# Patient Record
Sex: FEMALE | Race: WHITE | Employment: OTHER | URBAN - METROPOLITAN AREA
[De-identification: names, ages, dates, MRNs, and addresses within clinical notes are randomized per-mention and may not be internally consistent; named-entity substitution may affect disease eponyms.]

---

## 2018-05-27 ENCOUNTER — HOSPITAL ENCOUNTER (EMERGENCY)
Age: 83
Discharge: HOME | End: 2018-05-27
Attending: EMERGENCY MEDICINE
Payer: MEDICARE

## 2018-05-27 ENCOUNTER — APPOINTMENT (OUTPATIENT)
Dept: CT IMAGING | Age: 83
End: 2018-05-27
Attending: EMERGENCY MEDICINE
Payer: MEDICARE

## 2018-05-27 VITALS
RESPIRATION RATE: 18 BRPM | WEIGHT: 137 LBS | SYSTOLIC BLOOD PRESSURE: 135 MMHG | BODY MASS INDEX: 22.82 KG/M2 | OXYGEN SATURATION: 96 % | TEMPERATURE: 97.9 F | HEART RATE: 85 BPM | DIASTOLIC BLOOD PRESSURE: 74 MMHG | HEIGHT: 65 IN

## 2018-05-27 DIAGNOSIS — R42 VERTIGO: Primary | ICD-10-CM

## 2018-05-27 LAB
APPEARANCE UR: ABNORMAL
BACTERIA URNS QL MICRO: NEGATIVE /HPF
BILIRUB UR QL: NEGATIVE
COLOR UR: ABNORMAL
EPITH CASTS URNS QL MICRO: ABNORMAL /LPF
GLUCOSE UR STRIP.AUTO-MCNC: NEGATIVE MG/DL
HGB UR QL STRIP: NEGATIVE
KETONES UR QL STRIP.AUTO: >80 MG/DL
LEUKOCYTE ESTERASE UR QL STRIP.AUTO: ABNORMAL
NITRITE UR QL STRIP.AUTO: NEGATIVE
OTHER,OTHU: ABNORMAL
PH UR STRIP: 6 [PH] (ref 5–8)
PROT UR STRIP-MCNC: NEGATIVE MG/DL
RBC #/AREA URNS HPF: ABNORMAL /HPF (ref 0–5)
SP GR UR REFRACTOMETRY: 1.02 (ref 1–1.03)
UR CULT HOLD, URHOLD: NORMAL
UROBILINOGEN UR QL STRIP.AUTO: 0.2 EU/DL (ref 0.2–1)
WBC URNS QL MICRO: ABNORMAL /HPF (ref 0–4)

## 2018-05-27 PROCEDURE — 99283 EMERGENCY DEPT VISIT LOW MDM: CPT

## 2018-05-27 PROCEDURE — 81001 URINALYSIS AUTO W/SCOPE: CPT | Performed by: EMERGENCY MEDICINE

## 2018-05-27 PROCEDURE — 70450 CT HEAD/BRAIN W/O DYE: CPT

## 2018-05-27 PROCEDURE — 93005 ELECTROCARDIOGRAM TRACING: CPT

## 2018-05-27 RX ORDER — MECLIZINE HYDROCHLORIDE 25 MG/1
25 TABLET ORAL
Qty: 20 TAB | Refills: 0 | Status: SHIPPED | OUTPATIENT
Start: 2018-05-27

## 2018-05-27 RX ORDER — MECLIZINE HCL 12.5 MG 12.5 MG/1
25 TABLET ORAL
Status: DISCONTINUED | OUTPATIENT
Start: 2018-05-27 | End: 2018-05-27 | Stop reason: HOSPADM

## 2018-05-27 NOTE — ED PROVIDER NOTES
HPI Comments: 80 y.o. female with past medical history significant for macular degeneration who presents ambulatory from home, referred by [facility] with chief complaint of dizziness. Patient notes onset of dizziness yesterday morning described as \"room spinning. \" Patient notes drinking water with some relief. Patient reports that dizziness is worse with getting up and ambulating. Patient denies history of similar episode of dizziness. Patient notes \"slight\" headache last night and reports taking Aleve with relief. Patient also complains of nausea this morning and appetite loss. Of note, patient's CBC and BMP from [facility] were normal. Pertinent negatives include fever, cough, chest pain, shortness of breath, numbness, tingling, and weakness. There are no other acute medical concerns at this time. Social hx: Denies tobacco use; denies alcohol use; denies illicit drug use  PCP: Hawa Barraza MD    Note written by Jojo Schwartz, as dictated by Anupama Callaway MD 3:15 PM      The history is provided by the patient and a relative. No  was used. Past Medical History:   Diagnosis Date    Macular degeneration        Past Surgical History:   Procedure Laterality Date    HX HIP REPLACEMENT      left         History reviewed. No pertinent family history. Social History     Social History    Marital status:      Spouse name: N/A    Number of children: N/A    Years of education: N/A     Occupational History    Not on file. Social History Main Topics    Smoking status: Never Smoker    Smokeless tobacco: Never Used    Alcohol use No    Drug use: No    Sexual activity: Not on file     Other Topics Concern    Not on file     Social History Narrative         ALLERGIES: Review of patient's allergies indicates no known allergies. Review of Systems   Constitutional: Positive for appetite change (decreased). Negative for fever.    Respiratory: Negative for cough and shortness of breath. Cardiovascular: Negative for chest pain. Gastrointestinal: Positive for nausea. Neurological: Positive for dizziness and headaches (resolved). Negative for weakness and numbness. Vitals:    05/27/18 1343   BP: 139/76   Pulse: 88   Resp: 20   Temp: 97.9 °F (36.6 °C)   SpO2: 95%   Weight: 62.1 kg (137 lb)   Height: 5' 5\" (1.651 m)            Physical Exam   Constitutional: She is oriented to person, place, and time. She appears well-developed. No distress. Elderly; frail   HENT:   Head: Normocephalic and atraumatic. Eyes: Conjunctivae are normal. No scleral icterus. Right eye exhibits nystagmus (horizontal). Left eye exhibits nystagmus (horizontal). Pupils round, equal, and reactive   Neck: Neck supple. No tracheal deviation present. Cardiovascular: Normal rate, regular rhythm, normal heart sounds and intact distal pulses. Exam reveals no gallop and no friction rub. No murmur heard. Pulmonary/Chest: Effort normal and breath sounds normal. She has no wheezes. She has no rales. Abdominal: Soft. She exhibits no distension. There is no tenderness. There is no rebound and no guarding. Musculoskeletal: She exhibits no edema. Neurological: She is alert and oriented to person, place, and time. Skin: Skin is warm and dry. No rash noted. Psychiatric: She has a normal mood and affect. Nursing note and vitals reviewed. Note written by Jojo Richey, as dictated by No att. providers found 3:15 PM      [facility]      ED Course       Procedures    Progress Note:  Results, treatment, and follow up plan have been discussed with patient/son. Questions were answered. Romaine Leon MD     A/P: dizziness c/w BPPV; also c/o HA - reassuring appearance and exam; VSS; CBC (WBC 15 noted and of unclear significance), CMP, trop, EKG, head CT ok; UA with ketones; home with meclizine and PCP f/u.   Romaine Leon MD

## 2018-05-27 NOTE — ED TRIAGE NOTES
Triage:  Pt to ED by referral of [facility] due to concerns over intermittent dizziness that has been present for the past day. Pt states the dizziness symptoms occur at rest and with movement. Pt describes the symptoms has the surroundings or the room is spinning. Pt denies any recent falls, HA or other complaints. Pt into triage with wheel chair.

## 2018-05-27 NOTE — DISCHARGE INSTRUCTIONS
Vertigo: Care Instructions  Your Care Instructions    Vertigo is the feeling that you or your surroundings are moving when there is no actual movement. It is often described as a feeling of spinning, whirling, falling, or tilting. Vertigo may make you vomit or feel nauseated. You may have trouble standing or walking and may lose your balance. Vertigo is often related to an inner ear problem, but it can have other more serious causes. If vertigo continues, you may need more tests to find its cause. Follow-up care is a key part of your treatment and safety. Be sure to make and go to all appointments, and call your doctor if you are having problems. It's also a good idea to know your test results and keep a list of the medicines you take. How can you care for yourself at home? · Do not lie flat on your back. Prop yourself up slightly. This may reduce the spinning feeling. Keep your eyes open. · Move slowly so that you do not fall. · If your doctor recommends medicine, take it exactly as directed. · Do not drive while you are having vertigo. Certain exercises, called Harper-Daroff exercises, can help decrease vertigo. To do Harper-Daroff exercises:  · Sit on the edge of a bed or sofa and quickly lie down on the side that causes the worst vertigo. Lie on your side with your ear down. · Stay in this position for at least 30 seconds or until the vertigo goes away. · Sit up. If this causes vertigo, wait for it to stop. · Repeat the procedure on the other side. · Repeat this 10 times. Do these exercises 2 times a day until the vertigo is gone. When should you call for help? Call 911 anytime you think you may need emergency care. For example, call if:  ? · You passed out (lost consciousness). ? · You have symptoms of a stroke. These may include:  ¨ Sudden numbness, tingling, weakness, or loss of movement in your face, arm, or leg, especially on only one side of your body.   ¨ Sudden vision changes. ¨ Sudden trouble speaking. ¨ Sudden confusion or trouble understanding simple statements. ¨ Sudden problems with walking or balance. ¨ A sudden, severe headache that is different from past headaches. ?Call your doctor now or seek immediate medical care if:  ? · Vertigo occurs with a fever, a headache, or ringing in your ears. ? · You have new or increased nausea and vomiting. ? Watch closely for changes in your health, and be sure to contact your doctor if:  ? · Vertigo gets worse or happens more often. ? · Vertigo has not gotten better after 2 weeks. Where can you learn more? Go to http://mohan-justine.info/. Enter Y552 in the search box to learn more about \"Vertigo: Care Instructions. \"  Current as of: May 12, 2017  Content Version: 11.4  © 9139-1106 [employer]. Care instructions adapted under license by [employer] (which disclaims liability or warranty for this information). If you have questions about a medical condition or this instruction, always ask your healthcare professional. [place] [address] any warranty or liability for your use of this information.

## 2018-05-29 LAB
ATRIAL RATE: 81 BPM
CALCULATED P AXIS, ECG09: 37 DEGREES
CALCULATED R AXIS, ECG10: 11 DEGREES
CALCULATED T AXIS, ECG11: 33 DEGREES
DIAGNOSIS, 93000: NORMAL
P-R INTERVAL, ECG05: 118 MS
Q-T INTERVAL, ECG07: 410 MS
QRS DURATION, ECG06: 84 MS
QTC CALCULATION (BEZET), ECG08: 476 MS
VENTRICULAR RATE, ECG03: 81 BPM

## 2022-10-09 ENCOUNTER — HOSPITAL ENCOUNTER (EMERGENCY)
Age: 87
Discharge: NURSING, REHAB OR LONG-TERM CARE | End: 2022-10-09
Attending: STUDENT IN AN ORGANIZED HEALTH CARE EDUCATION/TRAINING PROGRAM
Payer: MEDICARE

## 2022-10-09 ENCOUNTER — APPOINTMENT (OUTPATIENT)
Dept: CT IMAGING | Age: 87
End: 2022-10-09
Attending: STUDENT IN AN ORGANIZED HEALTH CARE EDUCATION/TRAINING PROGRAM
Payer: MEDICARE

## 2022-10-09 VITALS
HEIGHT: 66 IN | OXYGEN SATURATION: 90 % | RESPIRATION RATE: 16 BRPM | BODY MASS INDEX: 19.52 KG/M2 | WEIGHT: 121.47 LBS | HEART RATE: 88 BPM | SYSTOLIC BLOOD PRESSURE: 95 MMHG | TEMPERATURE: 97.8 F | DIASTOLIC BLOOD PRESSURE: 80 MMHG

## 2022-10-09 DIAGNOSIS — S00.03XA HEMATOMA OF SCALP, INITIAL ENCOUNTER: ICD-10-CM
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
## 2022-10-09 LAB
ALBUMIN SERPL-MCNC: 3.3 G/DL (ref 3.5–5)
ALBUMIN/GLOB SERPL: 0.9 {RATIO} (ref 1.1–2.2)
ALP SERPL-CCNC: 88 U/L (ref 45–117)
ALT SERPL-CCNC: 32 U/L (ref 12–78)
ANION GAP SERPL CALC-SCNC: 4 MMOL/L (ref 5–15)
AST SERPL-CCNC: 24 U/L (ref 15–37)
BASOPHILS # BLD: 0 K/UL (ref 0–0.1)
BASOPHILS NFR BLD: 0 % (ref 0–1)
BILIRUB SERPL-MCNC: 0.4 MG/DL (ref 0.2–1)
BUN SERPL-MCNC: 24 MG/DL (ref 6–20)
BUN/CREAT SERPL: 27 (ref 12–20)
CALCIUM SERPL-MCNC: 9.3 MG/DL (ref 8.5–10.1)
CHLORIDE SERPL-SCNC: 104 MMOL/L (ref 97–108)
CO2 SERPL-SCNC: 30 MMOL/L (ref 21–32)
COMMENT, HOLDF: NORMAL
CREAT SERPL-MCNC: 0.88 MG/DL (ref 0.55–1.02)
DIFFERENTIAL METHOD BLD: ABNORMAL
EOSINOPHIL # BLD: 0 K/UL (ref 0–0.4)
EOSINOPHIL NFR BLD: 0 % (ref 0–7)
ERYTHROCYTE [DISTWIDTH] IN BLOOD BY AUTOMATED COUNT: 13.1 % (ref 11.5–14.5)
GLOBULIN SER CALC-MCNC: 3.5 G/DL (ref 2–4)
GLUCOSE SERPL-MCNC: 196 MG/DL (ref 65–100)
HCT VFR BLD AUTO: 47.6 % (ref 35–47)
HGB BLD-MCNC: 16 G/DL (ref 11.5–16)
IMM GRANULOCYTES # BLD AUTO: 0.5 K/UL (ref 0–0.04)
IMM GRANULOCYTES NFR BLD AUTO: 3 % (ref 0–0.5)
LYMPHOCYTES # BLD: 1.7 K/UL (ref 0.8–3.5)
LYMPHOCYTES NFR BLD: 10 % (ref 12–49)
MCH RBC QN AUTO: 31.3 PG (ref 26–34)
MCHC RBC AUTO-ENTMCNC: 33.6 G/DL (ref 30–36.5)
MCV RBC AUTO: 93 FL (ref 80–99)
MONOCYTES # BLD: 1 K/UL (ref 0–1)
MONOCYTES NFR BLD: 6 % (ref 5–13)
NEUTS SEG # BLD: 13.5 K/UL (ref 1.8–8)
NEUTS SEG NFR BLD: 81 % (ref 32–75)
NRBC # BLD: 0 K/UL (ref 0–0.01)
NRBC BLD-RTO: 0 PER 100 WBC
PLATELET # BLD AUTO: 268 K/UL (ref 150–400)
PMV BLD AUTO: 9.5 FL (ref 8.9–12.9)
POTASSIUM SERPL-SCNC: 4.4 MMOL/L (ref 3.5–5.1)
PROT SERPL-MCNC: 6.8 G/DL (ref 6.4–8.2)
RBC # BLD AUTO: 5.12 M/UL (ref 3.8–5.2)
RBC MORPH BLD: ABNORMAL
SAMPLES BEING HELD,HOLD: NORMAL
SODIUM SERPL-SCNC: 138 MMOL/L (ref 136–145)
WBC # BLD AUTO: 16.7 K/UL (ref 3.6–11)

## 2022-10-09 PROCEDURE — 80053 COMPREHEN METABOLIC PANEL: CPT

## 2022-10-09 PROCEDURE — 72125 CT NECK SPINE W/O DYE: CPT

## 2022-10-09 PROCEDURE — 85025 COMPLETE CBC W/AUTO DIFF WBC: CPT

## 2022-10-09 PROCEDURE — 70450 CT HEAD/BRAIN W/O DYE: CPT

## 2022-10-09 PROCEDURE — 93005 ELECTROCARDIOGRAM TRACING: CPT

## 2022-10-09 PROCEDURE — 36415 COLL VENOUS BLD VENIPUNCTURE: CPT

## 2022-10-09 PROCEDURE — 99284 EMERGENCY DEPT VISIT MOD MDM: CPT

## 2022-10-09 NOTE — ED TRIAGE NOTES
Pt arrives via EMS from [facility] 63 w/ cc of unwitnessed fall. EMS reports pt is on eliquis. Pt has hematoma on right side of head. Pt answers \"I have no idea\" to A&O questions besides her name/birthdate. Per EMS pt is normally A&Ox4 and is \"acting slower than normal.\" Pt reports she felt dizzy earlier and that caused her to fall. EMS reports BG of 284. Pt denies chest pain, sob, nausea/vomiting at this time. Pt has DNR at bedside.

## 2022-10-09 NOTE — DISCHARGE INSTRUCTIONS
You presented to ED after 2 days of falls. CT scan of the head and neck showed no acute fractures. No bleeds in your brain. You have a bruise or hematoma to the right side of your head. Recommend close follow-up with your facilities physical therapy occupational therapy team to work on mobility and prevent falls. You may apply ice to the hematoma for the next 24 hours. Take Tylenol as needed for pain.

## 2022-10-09 NOTE — ED NOTES
Update: pt son at bedside so pt son updated. Son will update supervisor when he drops pt off at [address]. Pt' son reports pt is at baseline mentation and self. Supervisor reports pt is normally not A&Ox4. RN will call back when available to discuss pt's care/plan.  [phone]

## 2022-10-10 LAB
ATRIAL RATE: 84 BPM
CALCULATED P AXIS, ECG09: 62 DEGREES
CALCULATED R AXIS, ECG10: -30 DEGREES
CALCULATED T AXIS, ECG11: 93 DEGREES
DIAGNOSIS, 93000: NORMAL
P-R INTERVAL, ECG05: 164 MS
Q-T INTERVAL, ECG07: 412 MS
QRS DURATION, ECG06: 130 MS
QTC CALCULATION (BEZET), ECG08: 486 MS
VENTRICULAR RATE, ECG03: 84 BPM

## 2022-10-10 NOTE — ED PROVIDER NOTES
Patient is 8-year-old female whose had a fall today and yesterday striking her head. Patient has small hematoma to the right side of the head. Patient is on Eliquis. No loss of consciousness, nausea vomiting chest pain. Fall  The accident occurred 1 to 2 hours ago. The fall occurred while standing. She fell from a height of ground level. She landed on Hard floor. There was no blood loss. The point of impact was the head. The pain is present in the head. The pain is at a severity of 0/10. The patient is experiencing no pain. She was Ambulatory at the scene. There was No entrapment after the fall. There was No drug use involved in the accident. Pertinent negatives include no nausea, no vomiting, no loss of consciousness and no laceration. The risk factors include dementia, being elderly and recurrent falls. The symptoms are aggravated by pressure on injury. She has tried nothing for the symptoms. The treatment provided no relief. It is unknown when the patient last had a tetanus shot. Past Medical History:   Diagnosis Date    Macular degeneration        Past Surgical History:   Procedure Laterality Date    HX HIP REPLACEMENT      left         No family history on file.     Social History     Socioeconomic History    Marital status:      Spouse name: Not on file    Number of children: Not on file    Years of education: Not on file    Highest education level: Not on file   Occupational History    Not on file   Tobacco Use    Smoking status: Never    Smokeless tobacco: Never   Substance and Sexual Activity    Alcohol use: No    Drug use: No    Sexual activity: Not on file   Other Topics Concern    Not on file   Social History Narrative    Not on file     Social Determinants of Health     Financial Resource Strain: Not on file   Food Insecurity: Not on file   Transportation Needs: Not on file   Physical Activity: Not on file   Stress: Not on file   Social Connections: Not on file   Intimate Partner Violence: Not on file   Housing Stability: Not on file         ALLERGIES: Patient has no known allergies. Review of Systems   Constitutional: Negative. HENT: Negative. Eyes: Negative. Respiratory: Negative. Cardiovascular: Negative. Gastrointestinal: Negative. Negative for nausea and vomiting. Endocrine: Negative. Genitourinary: Negative. Musculoskeletal:  Positive for gait problem (Uses Rollator). Skin:  Positive for color change. Allergic/Immunologic: Negative. Neurological:  Negative for loss of consciousness. Hematological: Negative. Psychiatric/Behavioral: Negative. Vitals:    10/09/22 1641 10/09/22 1700 10/09/22 1711 10/09/22 1826   BP: (!) 119/57 (!) 123/59 124/60 95/80   Pulse: 78 78 79 88   Resp: 21 21 22 16   Temp:       SpO2:    90%   Weight:       Height:                Physical Exam  Vitals and nursing note reviewed. Constitutional:       General: She is not in acute distress. Appearance: Normal appearance. HENT:      Head: Normocephalic. Comments: Small hematoma to right scalp     Right Ear: External ear normal.      Left Ear: External ear normal.      Nose: Nose normal.   Eyes:      Extraocular Movements: Extraocular movements intact. Conjunctiva/sclera: Conjunctivae normal.   Cardiovascular:      Rate and Rhythm: Normal rate. Pulses: Normal pulses. Radial pulses are 2+ on the right side and 2+ on the left side. Heart sounds: Normal heart sounds. Pulmonary:      Effort: Pulmonary effort is normal.      Breath sounds: Normal breath sounds. Chest:      Chest wall: No deformity or tenderness. Abdominal:      General: Abdomen is flat. There is no distension. Tenderness: There is no abdominal tenderness. Musculoskeletal:         General: No swelling, tenderness, deformity or signs of injury. Normal range of motion. Cervical back: Normal range of motion and neck supple. No tenderness.    Skin:     General: Skin is warm and dry. Capillary Refill: Capillary refill takes less than 2 seconds. Findings: No laceration. Neurological:      General: No focal deficit present. Mental Status: She is alert. Mental status is at baseline. Psychiatric:         Attention and Perception: Attention normal.         Mood and Affect: Mood normal.         Behavior: Behavior normal.        [facility]    ED Course as of 10/10/22 1248   Sun Oct 09, 2022   1441 EKG interpretation:   Rhythm: normal sinus rhythm and LBBB; and regular . Rate (approx.): 84; Axis: left axis deviation; Intervals: normal ; ST/T wave: normal; EKG documented and interpreted by Rebecca Greene. Wilner Rashid MD, Emergency Medicine.   [AL]      ED Course User Index  [AL] Leonora Landau., MD     LABORATORY RESULTS:  Labs Reviewed   CBC WITH AUTOMATED DIFF - Abnormal; Notable for the following components:       Result Value    WBC 16.7 (*)     HCT 47.6 (*)     NEUTROPHILS 81 (*)     LYMPHOCYTES 10 (*)     IMMATURE GRANULOCYTES 3 (*)     ABS. NEUTROPHILS 13.5 (*)     ABS. IMM. GRANS. 0.5 (*)     All other components within normal limits   METABOLIC PANEL, COMPREHENSIVE - Abnormal; Notable for the following components:    Anion gap 4 (*)     Glucose 196 (*)     BUN 24 (*)     BUN/Creatinine ratio 27 (*)     eGFR 58 (*)     Albumin 3.3 (*)     A-G Ratio 0.9 (*)     All other components within normal limits   SAMPLES BEING HELD   SAMPLE TO BLOOD BANK       IMAGING RESULTS:  CT HEAD WO CONT   Final Result   Small right frontal scalp hematoma. No acute intracranial abnormality. 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 Moderate left C6-7 and right C7-T1 neural foraminal stenosis   Moderate T5 age indeterminate compression deformity.  MRI would be useful in   determining the chronicity of this fracture          MEDICATIONS GIVEN:  Medications - No data to display    Differential diagnosis: Fall, head bleed, C-spine injury, other muscle skeletal injury    ED physician interpretation of imaging: CT head without acute bleeds  ED physician interpretation of EKG: No STEMI. See my interpretation EKG in ED course above. ED physician interpretation of laboratory results: Lab work with mild leukocytosis but no other signs of infection on physical exam or vital signs. Patient denies dysuria. Hyperglycemia without signs of DKA    MDM: Patient is 8-year-old female presented ED after a fall with hematoma to the right scalp. Patient is reportedly on Eliquis and therefore imaging was obtained. No head bleeds. Patient does have age-indeterminate compression type fracture at T1. No tenderness over this location. Suspect old fracture. Patient is well-appearing for 8 years old and has no complaints of pain. Physical exam with no concerning findings. Family comfortable with patient go back to the facility. They will work to get physical therapy to help her with walking and assess her for further needs. Further personalized recommendations for outpatient care as below. Key discharge instructions and summary of care: You presented to ED after 2 days of falls. CT scan of the head and neck showed no acute fractures. No bleeds in your brain. You have a bruise or hematoma to the right side of your head. Recommend close follow-up with your facilities physical therapy occupational therapy team to work on mobility and prevent falls. You may apply ice to the hematoma for the next 24 hours. Take Tylenol as needed for pain. The patient has been re-evaluated and feeling better. Patient is stable for discharge. All available radiology and laboratory results have been reviewed with patient and/or available family.   Patient and/or family verbally conveyed their understanding and agreement of the patient's signs, symptoms, diagnosis, treatment and prognosis and additionally agree to follow-up as recommended in the discharge instructions or to return to the Emergency Department should their condition change or worsen prior to their follow-up appointment. All questions have been answered and patient and/or available family express understanding. IMPRESSION:  1. Fall, initial encounter    2. Hematoma of scalp, initial encounter        DISPOSITION: Discharged     Teodoro Worthington MD          Procedures